# Patient Record
Sex: FEMALE | Race: BLACK OR AFRICAN AMERICAN | NOT HISPANIC OR LATINO | Employment: FULL TIME | URBAN - METROPOLITAN AREA
[De-identification: names, ages, dates, MRNs, and addresses within clinical notes are randomized per-mention and may not be internally consistent; named-entity substitution may affect disease eponyms.]

---

## 2018-11-06 ENCOUNTER — HOSPITAL ENCOUNTER (EMERGENCY)
Facility: MEDICAL CENTER | Age: 28
End: 2018-11-06
Attending: EMERGENCY MEDICINE

## 2018-11-06 ENCOUNTER — PATIENT OUTREACH (OUTPATIENT)
Dept: HEALTH INFORMATION MANAGEMENT | Facility: OTHER | Age: 28
End: 2018-11-06

## 2018-11-06 VITALS
RESPIRATION RATE: 18 BRPM | OXYGEN SATURATION: 98 % | BODY MASS INDEX: 38.05 KG/M2 | WEIGHT: 206.79 LBS | SYSTOLIC BLOOD PRESSURE: 115 MMHG | HEIGHT: 62 IN | HEART RATE: 61 BPM | DIASTOLIC BLOOD PRESSURE: 70 MMHG | TEMPERATURE: 97.6 F

## 2018-11-06 DIAGNOSIS — R51.9 ACUTE NONINTRACTABLE HEADACHE, UNSPECIFIED HEADACHE TYPE: ICD-10-CM

## 2018-11-06 DIAGNOSIS — J03.90 TONSILLITIS: ICD-10-CM

## 2018-11-06 DIAGNOSIS — J01.00 ACUTE NON-RECURRENT MAXILLARY SINUSITIS: ICD-10-CM

## 2018-11-06 DIAGNOSIS — R04.0 EPISTAXIS: ICD-10-CM

## 2018-11-06 PROCEDURE — 99283 EMERGENCY DEPT VISIT LOW MDM: CPT

## 2018-11-06 RX ORDER — PSEUDOEPHEDRINE HCL 120 MG/1
120 TABLET, FILM COATED, EXTENDED RELEASE ORAL 2 TIMES DAILY PRN
Qty: 60 TAB | Refills: 6 | Status: SHIPPED | OUTPATIENT
Start: 2018-11-06

## 2018-11-06 RX ORDER — AMOXICILLIN AND CLAVULANATE POTASSIUM 875; 125 MG/1; MG/1
1 TABLET, FILM COATED ORAL 2 TIMES DAILY
Qty: 14 TAB | Refills: 0 | Status: SHIPPED | OUTPATIENT
Start: 2018-11-06 | End: 2018-11-13

## 2018-11-06 ASSESSMENT — PAIN SCALES - GENERAL: PAINLEVEL_OUTOF10: 5

## 2018-11-06 NOTE — ED PROVIDER NOTES
ED Provider Note    Scribed for Marianne Harmon M.D. by Lawrence Helm. 11/6/2018  3:42 PM    Primary care provider: Pcp Pt states none  Means of arrival: Walk-in  History obtained from: Patient  History limited by: None    CHIEF COMPLAINT  Chief Complaint   Patient presents with   • Head Ache     since Saturday, frontal   • Epistaxis     since this am,  intermittently       HPI  Eugene Barrientos is a 28 y.o. female who presents to the Emergency Department for intermittent generalized headaches onset 3 days ago. She has experienced similar symptoms previously, but states current symptoms do not feel similar as prior. She has associated nausea, but denies vomiting, ear pain, or fever. There are no alleviating or exacerbating factors. The patient began developing a right-sided sore throat 2 days ago with associated cough. She also began developing left-sided epistaxis this morning, but reports history of this. She denies any trauma to the face to have induced symptoms. She denies any epistaxis at this time. The patient is currently on her menstrual cycle. She denies smoking cigarettes or drinking alcohol. She does not take daily medications. She has no allergies to medications     REVIEW OF SYSTEMS  HEENT: Sore throat, epistaxis, No ear pain  PULMONARY: Cough   GI: Nausea, no vomiting   Neuro: Headache   Endocrine: no fevers    See history of present illness.    PAST MEDICAL HISTORY  History of epistaxis      SURGICAL HISTORY  patient denies any surgical history    SOCIAL HISTORY  Social History   Substance Use Topics   • Smoking status: Never Smoker   • Smokeless tobacco: Never Used   • Alcohol use No      History   Drug Use No       FAMILY HISTORY  History reviewed. No pertinent family history.    CURRENT MEDICATIONS  Home Medications     Reviewed by Joyce Al R.N. (Registered Nurse) on 11/06/18 at 1458  Med List Status: Complete   Medication Last Dose Status        Patient Goran Taking any Medications         "               ALLERGIES  No Known Allergies    PHYSICAL EXAM  VITAL SIGNS: /81   Pulse 96   Temp 36.4 °C (97.6 °F)   Resp 17   Ht 1.575 m (5' 2\")   Wt 93.8 kg (206 lb 12.7 oz)   LMP 11/03/2018 (Exact Date)   SpO2 98%   BMI 37.82 kg/m²     Constitutional: Well developed, Well nourished, No acute distress, Non-toxic appearance.   HEENT: Normocephalic, Atraumatic,  external ears normal, Mucous  Membranes moist, oropharynx mildly erythematous with bilateral tonsillar exudates, rhinorrhea with mucosal edema. Fluid behind bilateral TM's. No drooling. No trismus. No uvular deviation. No voice changes. Area in the left anterior septum with a scab, but no active bleeding or bleeding in posterior oropharynx.  Eyes: PERRL, EOMI, Conjunctiva normal, No discharge.   Neck: Normal range of motion, No tenderness, Supple, No stridor.   Lymphatic: No lymphadenopathy    Cardiovascular: Regular Rate and Rhythm, No murmurs,  rubs, or gallops.   Thorax & Lungs: Lungs clear to auscultation bilaterally, No respiratory distress, No wheezes, rhales or rhonchi, No chest wall tenderness.   Abdomen: Bowel sounds normal, Soft, non tender, non distended,  No pulsatile masses., no rebound guarding or peritoneal signs.   Skin: Warm, Dry, No erythema, No rash,   Back:  No CVA tenderness,  No spinal tenderness, bony crepitance, step offs, or instability.   Neurologic: Alert & oriented x 3, Normal motor function, Normal sensory function, No focal deficits noted. Normal reflexes. Normal Cranial Nerves.  Extremities: Equal, intact distal pulses, No cyanosis, clubbing or edema,  No tenderness.   Musculoskeletal: Good range of motion in all major joints. No tenderness to palpation or major deformities noted.     COURSE & MEDICAL DECISION MAKING  Nursing notes, LAURENT VASQUEZx reviewed in chart.    3:42 PM Patient seen and examined at bedside. Informed the patient that symptoms are likely due to viral infection, so she will be treated with " Augmentin. Plan of care was discussed with her, which includes taking Sudafed.  is contacted to help the patient establish a PCP. The patient will be discharged with Augmentin and Sudafed and should return if symptoms worsen or if new symptoms arise. The patient understands and agrees to plan.      4:08 PM The patient is requesting an HIV test. She will be given resources for testing as we do not perform HIV tests in the ED.     The patient will return for new or worsening symptoms and is stable at the time of discharge.    The patient is referred to a primary physician for blood pressure management, diabetic screening, and for all other preventative health concerns.      DISPOSITION:  Patient will be discharged home in stable condition.    FOLLOW UP:  primary care  call 1 day for recheck        [address] [address] [phone]  Call in 1 day  to establish care, for recheck       OUTPATIENT MEDICATIONS:  New Prescriptions    AMOXICILLIN-CLAVULANATE (AUGMENTIN) 875-125 MG TAB    Take 1 Tab by mouth 2 times a day for 7 days.    PSEUDOEPHEDRINE SR (SUDAFED SR) 120 MG TABLET SR 12 HR    Take 1 Tab by mouth 2 times a day as needed for Congestion.        FINAL IMPRESSION  1. Epistaxis    2. Acute non-recurrent maxillary sinusitis    3. Acute nonintractable headache, unspecified headache type    4. Tonsillitis          Lawrence VALDEZ (Ronaldo), am scribing for, and in the presence of, Marianne Harmon M.D..    Electronically signed by: Lawrence Graves), 11/6/2018    Marianne VALDEZ M.D. personally performed the services described in this documentation, as scribed by Lwarence Helm in my presence, and it is both accurate and complete. E.     The note accurately reflects work and decisions made by me.  Marianne Harmon  11/6/2018  5:45 PM
